# Patient Record
Sex: MALE | Race: WHITE | Employment: OTHER | URBAN - NONMETROPOLITAN AREA
[De-identification: names, ages, dates, MRNs, and addresses within clinical notes are randomized per-mention and may not be internally consistent; named-entity substitution may affect disease eponyms.]

---

## 2023-02-11 ENCOUNTER — HOSPITAL ENCOUNTER (EMERGENCY)
Age: 33
Discharge: HOME | End: 2023-02-11
Attending: EMERGENCY MEDICINE
Payer: COMMERCIAL

## 2023-02-11 ENCOUNTER — APPOINTMENT (OUTPATIENT)
Dept: GENERAL RADIOLOGY | Age: 33
End: 2023-02-11
Payer: COMMERCIAL

## 2023-02-11 ENCOUNTER — APPOINTMENT (OUTPATIENT)
Dept: INTERVENTIONAL RADIOLOGY/VASCULAR | Age: 33
End: 2023-02-11
Payer: COMMERCIAL

## 2023-02-11 VITALS
DIASTOLIC BLOOD PRESSURE: 84 MMHG | HEIGHT: 74 IN | TEMPERATURE: 98.9 F | BODY MASS INDEX: 20.53 KG/M2 | HEART RATE: 109 BPM | OXYGEN SATURATION: 97 % | WEIGHT: 160 LBS | RESPIRATION RATE: 13 BRPM | SYSTOLIC BLOOD PRESSURE: 131 MMHG

## 2023-02-11 DIAGNOSIS — J10.1 INFLUENZA A: Primary | ICD-10-CM

## 2023-02-11 LAB
ALBUMIN SERPL BCG-MCNC: 4.9 G/DL (ref 3.5–5.1)
ALP SERPL-CCNC: 72 U/L (ref 38–126)
ALT SERPL W/O P-5'-P-CCNC: 26 U/L (ref 11–66)
ANION GAP SERPL CALC-SCNC: 15 MEQ/L (ref 8–16)
AST SERPL-CCNC: 31 U/L (ref 5–40)
BASOPHILS ABSOLUTE: 0 THOU/MM3 (ref 0–0.1)
BASOPHILS NFR BLD AUTO: 0.4 %
BILIRUB SERPL-MCNC: 0.5 MG/DL (ref 0.3–1.2)
BILIRUB UR QL STRIP: NEGATIVE
BUN SERPL-MCNC: 16 MG/DL (ref 7–22)
CALCIUM SERPL-MCNC: 9.5 MG/DL (ref 8.5–10.5)
CHARACTER UR: CLEAR
CHLORIDE SERPL-SCNC: 96 MEQ/L (ref 98–111)
CO2 SERPL-SCNC: 25 MEQ/L (ref 23–33)
COLOR UR: YELLOW
CREAT SERPL-MCNC: 0.7 MG/DL (ref 0.4–1.2)
CRP SERPL-MCNC: 2.16 MG/DL (ref 0–1)
DEPRECATED RDW RBC AUTO: 53.1 FL (ref 35–45)
EOSINOPHIL NFR BLD AUTO: 0.4 %
EOSINOPHILS ABSOLUTE: 0 THOU/MM3 (ref 0–0.4)
ERYTHROCYTE [DISTWIDTH] IN BLOOD BY AUTOMATED COUNT: 16.7 % (ref 11.5–14.5)
ERYTHROCYTE [SEDIMENTATION RATE] IN BLOOD BY WESTERGREN METHOD: 2 MM/HR (ref 0–10)
FLUAV RNA RESP QL NAA+PROBE: DETECTED
FLUBV RNA RESP QL NAA+PROBE: NOT DETECTED
GFR SERPL CREATININE-BSD FRML MDRD: > 60 ML/MIN/1.73M2
GLUCOSE SERPL-MCNC: 91 MG/DL (ref 70–108)
GLUCOSE UR QL STRIP.AUTO: NEGATIVE MG/DL
HCT VFR BLD AUTO: 48.8 % (ref 42–52)
HGB BLD-MCNC: 15.6 GM/DL (ref 14–18)
HGB UR QL STRIP.AUTO: NEGATIVE
IMM GRANULOCYTES # BLD AUTO: 0.02 THOU/MM3 (ref 0–0.07)
IMM GRANULOCYTES NFR BLD AUTO: 0.4 %
KETONES UR QL STRIP.AUTO: NEGATIVE
LACTIC ACID, SEPSIS: 1.4 MMOL/L (ref 0.5–1.9)
LEUKOCYTE ESTERASE UR QL STRIP.AUTO: NEGATIVE
LYMPHOCYTES ABSOLUTE: 1.1 THOU/MM3 (ref 1–4.8)
LYMPHOCYTES NFR BLD AUTO: 19.8 %
MCH RBC QN AUTO: 27.9 PG (ref 26–33)
MCHC RBC AUTO-ENTMCNC: 32 GM/DL (ref 32.2–35.5)
MCV RBC AUTO: 87.1 FL (ref 80–94)
MONOCYTES ABSOLUTE: 0.6 THOU/MM3 (ref 0.4–1.3)
MONOCYTES NFR BLD AUTO: 11.3 %
NEUTROPHILS NFR BLD AUTO: 67.7 %
NITRITE UR QL STRIP.AUTO: NEGATIVE
NRBC BLD AUTO-RTO: 0 /100 WBC
OSMOLALITY SERPL CALC.SUM OF ELEC: 272.7 MOSMOL/KG (ref 275–300)
PH UR STRIP.AUTO: 6 [PH] (ref 5–9)
PLATELET # BLD AUTO: 187 THOU/MM3 (ref 130–400)
PMV BLD AUTO: 11.5 FL (ref 9.4–12.4)
POTASSIUM SERPL-SCNC: 5.3 MEQ/L (ref 3.5–5.2)
PROCALCITONIN SERPL IA-MCNC: 0.07 NG/ML (ref 0.01–0.09)
PROT SERPL-MCNC: 8.1 G/DL (ref 6.1–8)
PROT UR STRIP.AUTO-MCNC: NEGATIVE MG/DL
RBC # BLD AUTO: 5.6 MILL/MM3 (ref 4.7–6.1)
SARS-COV-2 RNA RESP QL NAA+PROBE: NOT DETECTED
SEGMENTED NEUTROPHILS ABSOLUTE COUNT: 3.8 THOU/MM3 (ref 1.8–7.7)
SODIUM SERPL-SCNC: 136 MEQ/L (ref 135–145)
SP GR UR REFRACT.AUTO: 1.01 (ref 1–1.03)
UROBILINOGEN UR QL STRIP.AUTO: 0.2 EU/DL (ref 0–1)
WBC # BLD AUTO: 5.6 THOU/MM3 (ref 4.8–10.8)

## 2023-02-11 PROCEDURE — 80053 COMPREHEN METABOLIC PANEL: CPT

## 2023-02-11 PROCEDURE — 81003 URINALYSIS AUTO W/O SCOPE: CPT

## 2023-02-11 PROCEDURE — 87040 BLOOD CULTURE FOR BACTERIA: CPT

## 2023-02-11 PROCEDURE — 2580000003 HC RX 258: Performed by: STUDENT IN AN ORGANIZED HEALTH CARE EDUCATION/TRAINING PROGRAM

## 2023-02-11 PROCEDURE — 85025 COMPLETE CBC W/AUTO DIFF WBC: CPT

## 2023-02-11 PROCEDURE — 96361 HYDRATE IV INFUSION ADD-ON: CPT

## 2023-02-11 PROCEDURE — 87636 SARSCOV2 & INF A&B AMP PRB: CPT

## 2023-02-11 PROCEDURE — 36415 COLL VENOUS BLD VENIPUNCTURE: CPT

## 2023-02-11 PROCEDURE — 71046 X-RAY EXAM CHEST 2 VIEWS: CPT

## 2023-02-11 PROCEDURE — 6370000000 HC RX 637 (ALT 250 FOR IP): Performed by: STUDENT IN AN ORGANIZED HEALTH CARE EDUCATION/TRAINING PROGRAM

## 2023-02-11 PROCEDURE — 86140 C-REACTIVE PROTEIN: CPT

## 2023-02-11 PROCEDURE — 85651 RBC SED RATE NONAUTOMATED: CPT

## 2023-02-11 PROCEDURE — 93971 EXTREMITY STUDY: CPT

## 2023-02-11 PROCEDURE — 83605 ASSAY OF LACTIC ACID: CPT

## 2023-02-11 PROCEDURE — 99285 EMERGENCY DEPT VISIT HI MDM: CPT

## 2023-02-11 PROCEDURE — 96374 THER/PROPH/DIAG INJ IV PUSH: CPT

## 2023-02-11 PROCEDURE — 6360000002 HC RX W HCPCS: Performed by: STUDENT IN AN ORGANIZED HEALTH CARE EDUCATION/TRAINING PROGRAM

## 2023-02-11 PROCEDURE — 84145 PROCALCITONIN (PCT): CPT

## 2023-02-11 PROCEDURE — 93005 ELECTROCARDIOGRAM TRACING: CPT | Performed by: STUDENT IN AN ORGANIZED HEALTH CARE EDUCATION/TRAINING PROGRAM

## 2023-02-11 RX ORDER — KETOROLAC TROMETHAMINE 30 MG/ML
15 INJECTION, SOLUTION INTRAMUSCULAR; INTRAVENOUS ONCE
Status: COMPLETED | OUTPATIENT
Start: 2023-02-11 | End: 2023-02-11

## 2023-02-11 RX ORDER — OSELTAMIVIR PHOSPHATE 75 MG/1
75 CAPSULE ORAL 2 TIMES DAILY
Status: DISCONTINUED | OUTPATIENT
Start: 2023-02-11 | End: 2023-02-11 | Stop reason: HOSPADM

## 2023-02-11 RX ORDER — BACLOFEN 10 MG/1
10 TABLET ORAL ONCE
Status: COMPLETED | OUTPATIENT
Start: 2023-02-11 | End: 2023-02-11

## 2023-02-11 RX ORDER — 0.9 % SODIUM CHLORIDE 0.9 %
1000 INTRAVENOUS SOLUTION INTRAVENOUS ONCE
Status: COMPLETED | OUTPATIENT
Start: 2023-02-11 | End: 2023-02-11

## 2023-02-11 RX ADMIN — BACLOFEN 10 MG: 10 TABLET ORAL at 18:02

## 2023-02-11 RX ADMIN — KETOROLAC TROMETHAMINE 15 MG: 30 INJECTION, SOLUTION INTRAMUSCULAR; INTRAVENOUS at 16:42

## 2023-02-11 RX ADMIN — SODIUM CHLORIDE 1000 ML: 9 INJECTION, SOLUTION INTRAVENOUS at 16:08

## 2023-02-11 ASSESSMENT — PAIN SCALES - GENERAL
PAINLEVEL_OUTOF10: 7
PAINLEVEL_OUTOF10: 7
PAINLEVEL_OUTOF10: 8

## 2023-02-11 ASSESSMENT — PAIN DESCRIPTION - LOCATION
LOCATION: LEG
LOCATION: KNEE;LEG

## 2023-02-11 ASSESSMENT — PAIN DESCRIPTION - ORIENTATION
ORIENTATION: LEFT
ORIENTATION: LEFT

## 2023-02-11 ASSESSMENT — PAIN - FUNCTIONAL ASSESSMENT: PAIN_FUNCTIONAL_ASSESSMENT: 0-10

## 2023-02-11 NOTE — ED NOTES
Pt ED from [facility]. States he was dx'd with influenza by his nurse at facility and has had a fever x2 days. Requesting atb.      Kevin Parham, ANTONY  02/11/23 5342

## 2023-02-11 NOTE — ED NOTES
Upon first contact pt requesting self cath kit.  This RN to monitor care      Deborah Rockwell, RN  02/11/23 2715

## 2023-02-11 NOTE — DISCHARGE INSTRUCTIONS
You were seen emergency department today for your fever. You were found to be influenza A positive. Recommend drinking plenty of fluids, small frequent meals, follow-up with PCP in the next 3 to 4 days if symptoms fail to improve. Tylenol Motrin as needed for fevers and aches. Return to emergency department for new or worsening symptoms.

## 2023-02-11 NOTE — ED PROVIDER NOTES
[facility] [address] [address] EMERGENCY DEPT      EMERGENCY MEDICINE     Pt Name: Meghna Lewis  MRN: [id number]  [place] 1990  Date of evaluation: 2/11/2023  Provider: Ry Starr MD    CHIEF COMPLAINT       Chief Complaint   Patient presents with    Fever     HISTORY OF PRESENT ILLNESS   Meghna Lewis is a pleasant 33 y.o. male with past medical history of IV drug abuse (last was in August 2022 ), smoker, paraplegia w/ muscle spasm who presents to the emergency department from  [facility] , by private vehicle for evaluation of fevers. The patient was in his usual state of health until 2 days ago, when he developed a fever with associated chills, body aches, and runny nose. Does also report some SOB and chest pain. Denies nausea, vomiting, abdominal pain, diarrhea, headaches, cough, or sore throat. States that some of the other residents at [address] [place] [address] have been ill with COVID and influenza. PASTMEDICAL HISTORY   No past medical history on file. There is no problem list on file for this patient. SURGICAL HISTORY     No past surgical history on file. CURRENT MEDICATIONS       Previous Medications    No medications on file       ALLERGIES     has No Known Allergies. FAMILY HISTORY     has no family status information on file. SOCIAL HISTORY          PHYSICAL EXAM       ED Triage Vitals [02/11/23 1504]   BP Temp Temp Source Heart Rate Resp SpO2 Height Weight   109/80 98 °F (36.7 °C) Oral (!) 123 23 97 % 6' 2\" (1.88 m) 160 lb (72.6 kg)       Additional Vital Signs:  Vitals:    02/11/23 1803   BP: 131/84   Pulse: (!) 109   Resp: 13   Temp:    SpO2: 97%     Physical Exam  Vitals and nursing note reviewed. Constitutional:       General: He is not in acute distress. Appearance: He is not toxic-appearing. HENT:      Head: Normocephalic and atraumatic. Right Ear: Tympanic membrane normal.      Left Ear: Tympanic membrane normal.      Nose: Rhinorrhea present. No congestion. Mouth/Throat:      Mouth: Mucous membranes are moist.      Pharynx: Oropharynx is clear. No oropharyngeal exudate or posterior oropharyngeal erythema. Eyes:      Extraocular Movements: Extraocular movements intact. Conjunctiva/sclera: Conjunctivae normal.   Cardiovascular:      Rate and Rhythm: Normal rate and regular rhythm. Pulses: Normal pulses. Heart sounds: Normal heart sounds. No murmur heard. Pulmonary:      Effort: Pulmonary effort is normal. No respiratory distress. Breath sounds: Normal breath sounds. Abdominal:      General: Abdomen is flat. There is no distension. Palpations: Abdomen is soft. Tenderness: There is no abdominal tenderness. Musculoskeletal:         General: Tenderness (left calf) present. Cervical back: Normal range of motion and neck supple. No rigidity or tenderness. Right lower leg: No edema. Left lower leg: No edema. Comments: Paraplegia, in a wheel chair. Diffuse symmetric muscle wasting in b/l lower extremities    Lymphadenopathy:      Cervical: No cervical adenopathy. Skin:     Capillary Refill: Capillary refill takes 2 to 3 seconds. Findings: Rash (b/l upper extremities) present. No lesion. Neurological:      General: No focal deficit present. Mental Status: He is alert and oriented to person, place, and time. Sensory: No sensory deficit. Psychiatric:         Mood and Affect: Mood normal.         Behavior: Behavior normal.       FORMAL DIAGNOSTIC RESULTS     RADIOLOGY: Interpretation per the Radiologist below, if available at the time of this note (none if blank):    VL DUP LOWER EXTREMITY VENOUS LEFT   Final Result      No evidence of deep venous thrombosis in the left lower extremity. Final report electronically signed by Dr. Mejia Haywood on 2/11/2023 6:07 PM      XR CHEST (2 VW)   Final Result      No acute intrathoracic process.                **This report has been created using voice recognition software. It may contain minor errors which are inherent in voice recognition technology. **      Final report electronically signed by Dr. Felicita Ceballos on 2/11/2023 4:01 PM          LABS: (none if blank)  Labs Reviewed   COVID-19 & INFLUENZA COMBO - Abnormal; Notable for the following components:       Result Value    INFLUENZA A DETECTED (*)     All other components within normal limits   CBC WITH AUTO DIFFERENTIAL - Abnormal; Notable for the following components:    MCHC 32.0 (*)     RDW-CV 16.7 (*)     RDW-SD 53.1 (*)     All other components within normal limits   COMPREHENSIVE METABOLIC PANEL - Abnormal; Notable for the following components:    Potassium 5.3 (*)     Chloride 96 (*)     Total Protein 8.1 (*)     All other components within normal limits   C-REACTIVE PROTEIN - Abnormal; Notable for the following components:    CRP 2.16 (*)     All other components within normal limits   OSMOLALITY - Abnormal; Notable for the following components:    Osmolality Calc 272.7 (*)     All other components within normal limits   CULTURE, BLOOD 1    Narrative:     Epic Plan - [id number]   CULTURE, BLOOD 1    Narrative:     Epic Plan - [id number]   URINALYSIS   LACTATE, SEPSIS   PROCALCITONIN   SEDIMENTATION RATE   ANION GAP   GLOMERULAR FILTRATION RATE, ESTIMATED       (Any cultures that may have been sent were not resulted at the time of this patient visit)    [address] / ED COURSE:     1) Number and Complexity of Problems            Problem List This Visit:         Chief Complaint   Patient presents with    Fever    Tachycardia         Differential Diagnosis includes (but not limited to):  Pneumonia vs endocarditis, vs UTI vs septicemia vs viral illness         Diagnoses Considered but I have low suspicion of:   Meningitis, given lack of meningismus              Pertinent Comorbid Conditions:    Hx of IV drug use, smoker, paraplegia     2)  Data Reviewed (none if left blank)          My Independent interpretations:     EKG:      Sinus tachycardia, No ST elevations     Imaging: CXR negative for acute process    Labs:      Positive for influenza                  Decision Rules/Clinical Scores utilized:              External Documentation Reviewed:         Previous patient encounter documents & history available on EMR was reviewed              See Formal Diagnostic Results above for the lab and radiology tests and orders. 3)  Treatment and Disposition         ED Reassessment:  Cramping in the LLE is improved with fluids, toradol, and baclofen. Patient declining tamiflu at this time. Discussed the benefits of tamiflu including decreased hospitalization and shortened duration of symptoms. Patient ready for discharge. Mild tachycardia is related to his fevers, which are related to having influenza A. Discussed precautions for returning to the ED. Patient understands and agrees.          Case discussed with consulting clinician:           Shared Decision-Making was performed and disposition discussed with the        Patient/Family and questions answered          Social determinants of health impacting treatment or disposition:  Hx of IV drug use, lives in rehab facility          Code Status:  full      Summary of Patient Presentation:      MDM  /  ED Course as of 02/11/23 1851   Sat Feb 11, 2023   1627 Urinalysis:    Glucose, Urine NEGATIVE   Bilirubin, Urine NEGATIVE   Ketones, Urine NEGATIVE   Specific Gravity, UA 1.013   Blood, Urine NEGATIVE   pH, UA 6.0   Protein, UA NEGATIVE   Urobilinogen, Urine 0.2   Nitrite, Urine NEGATIVE   Leukocytes, UA NEGATIVE   Color, UA YELLOW   Character, Urine CLEAR [YESY]   1800 COVID-19 & Influenza Combo(!!):    SARS-CoV-2 RNA, RT PCR NOT DETECTED   INFLUENZA A DETECTED(!!)   INFLUENZA B NOT DETECTED [YESY]   1801 INFLUENZA A(!!): DETECTED [YESY]   1801 CBC with Auto Differential(!):    WBC 5.6   RBC 5.60   Hemoglobin Quant 15.6   Hematocrit 48.8   MCV 87.1   MCH 27.9 MCHC 32.0(!)   RDW-CV 16.7(!)   RDW-SD 53.1(!)   Platelet Count 359   MPV 11.5   Seg Neutrophils 67.7   Lymphocytes 19.8   Monocytes 11.3   Eosinophils 0.4   Basophils 0.4   Immature Granulocytes 0.4   Segs Absolute 3.8   Lymphocytes Absolute 1.1   Monocytes Absolute 0.6   Eosinophils Absolute 0.0   Basophils Absolute 0.0   Immature Grans (Abs) 0.02   Nucleated Red Blood Cells 0 [YESY]   1801 C-Reactive Protein(!):    CRP 2.16(!) [YESY]      ED Course User Index  [YESY] Florentino Tejada MD     Vitals Reviewed:    Vitals:    02/11/23 1506 02/11/23 1609 02/11/23 1658 02/11/23 1803   BP:  (!) 119/90  131/84   Pulse: (!) 107 (!) 104  (!) 109   Resp:  20  13   Temp:  99.4 °F (37.4 °C) 99.9 °F (37.7 °C)    TempSrc:  Oral Oral    SpO2:  100%  97%   Weight:       Height:           The patient was seen and examined. Appropriate diagnostic testing was performed and results reviewed with the patient. The results of pertinent diagnostic studies and exam findings were discussed. The patients provisional diagnosis and plan of care were discussed with the patient and present family who expressed understanding. Any medications were reviewed and indications and risks of medications were discussed with the patient /family present. Strict verbal and written return precautions, instructions and appropriate follow-up provided to  the patient . ED Medications administered this visit:  (None if blank)  Medications   oseltamivir (TAMIFLU) capsule 75 mg (75 mg Oral Not Given 2/11/23 1643)   0.9 % sodium chloride bolus (1,000 mLs IntraVENous New Bag 2/11/23 1608)   ketorolac (TORADOL) injection 15 mg (15 mg IntraVENous Given 2/11/23 1642)   baclofen (LIORESAL) tablet 10 mg (10 mg Oral Given 2/11/23 1802)         PROCEDURES: (None if blank)  Procedures:     CRITICAL CARE: (None if blank)      DISCHARGE PRESCRIPTIONS: (None if blank)  New Prescriptions    No medications on file       FINAL IMPRESSION      1.  Influenza A DISPOSITION/PLAN   DISPOSITION Decision To Discharge 02/11/2023 06:23:02 PM      OUTPATIENT FOLLOW UP THE PATIENT:  [address] [place] [address],[address]  [facility] [address] [place] [address] Rd  [phone]  In 3 days  As needed    MD Maggie Blair MD  Resident  02/11/23 3784

## 2023-02-12 LAB
BACTERIA BLD AEROBE CULT: NORMAL
BACTERIA BLD AEROBE CULT: NORMAL
EKG ATRIAL RATE: 105 BPM
EKG P AXIS: 69 DEGREES
EKG P-R INTERVAL: 130 MS
EKG Q-T INTERVAL: 302 MS
EKG QRS DURATION: 90 MS
EKG QTC CALCULATION (BAZETT): 399 MS
EKG R AXIS: 75 DEGREES
EKG T AXIS: 64 DEGREES
EKG VENTRICULAR RATE: 105 BPM

## 2023-02-12 PROCEDURE — 93010 ELECTROCARDIOGRAM REPORT: CPT | Performed by: INTERNAL MEDICINE

## 2023-02-12 NOTE — ED NOTES
[facility] called for transportation back. Information faxed to facility for review and acceptance back to facility. Waiting for response at this time.      Ammon Kawasaki, RN  02/11/23 192

## 2023-02-16 LAB
BACTERIA BLD AEROBE CULT: NORMAL
BACTERIA BLD AEROBE CULT: NORMAL